# Patient Record
Sex: FEMALE | Race: WHITE | NOT HISPANIC OR LATINO | URBAN - METROPOLITAN AREA
[De-identification: names, ages, dates, MRNs, and addresses within clinical notes are randomized per-mention and may not be internally consistent; named-entity substitution may affect disease eponyms.]

---

## 2018-04-24 ENCOUNTER — OFFICE VISIT (OUTPATIENT)
Dept: FAMILY MEDICINE CLINIC | Facility: CLINIC | Age: 48
End: 2018-04-24

## 2018-04-24 VITALS
WEIGHT: 166.5 LBS | BODY MASS INDEX: 25.23 KG/M2 | SYSTOLIC BLOOD PRESSURE: 104 MMHG | HEIGHT: 68 IN | TEMPERATURE: 98.8 F | HEART RATE: 69 BPM | OXYGEN SATURATION: 97 % | DIASTOLIC BLOOD PRESSURE: 78 MMHG

## 2018-04-24 DIAGNOSIS — Z11.1 PPD SCREENING TEST: ICD-10-CM

## 2018-04-24 DIAGNOSIS — Z00.00 ROUTINE GENERAL MEDICAL EXAMINATION AT A HEALTH CARE FACILITY: Primary | ICD-10-CM

## 2018-04-24 PROCEDURE — 99396 PREV VISIT EST AGE 40-64: CPT | Performed by: FAMILY MEDICINE

## 2018-04-24 PROCEDURE — 86580 TB INTRADERMAL TEST: CPT

## 2018-04-24 RX ORDER — ALBUTEROL SULFATE 90 UG/1
1-2 AEROSOL, METERED RESPIRATORY (INHALATION)
COMMUNITY
Start: 2014-03-07

## 2018-04-24 RX ORDER — IRON/C/B12/FOLATE/ZINC/SUCCIN 75MG-175MG
TABLET ORAL
COMMUNITY
Start: 2018-02-26

## 2018-04-24 RX ORDER — MELOXICAM 15 MG/1
1 TABLET ORAL DAILY
COMMUNITY
Start: 2013-10-07 | End: 2018-04-24

## 2018-04-24 RX ORDER — SPIRONOLACTONE 100 MG/1
TABLET, FILM COATED ORAL
COMMUNITY
Start: 2018-03-14

## 2018-04-24 NOTE — PROGRESS NOTES
Assessment/Plan:    No problem-specific Assessment & Plan notes found for this encounter  Diagnoses and all orders for this visit:    Routine general medical examination at a health care facility       Lorretta Goldberg is a generally healthy 50-year-old female for her work physical for [employer]  I did complete her form, she received her PPD today, and she will return to the office in 2 days for her PPD reading and to  performs  Subjective:      Patient ID: Mohini Shepherd is a 49 y o  female  The pt is here today for a routine physical for [employer]  She has no specific complaints or concerns today  She does have a PCP whom she sees when needed          The following portions of the patient's history were reviewed and updated as appropriate: allergies, current medications, past family history, past medical history, past social history, past surgical history and problem list     Review of Systems   Constitutional: Negative for chills, fever and unexpected weight change  HENT: Negative for congestion, ear pain, hearing loss, postnasal drip, rhinorrhea and sore throat  Eyes: Negative for visual disturbance  Respiratory: Negative for cough and shortness of breath  Cardiovascular: Negative for chest pain  Gastrointestinal: Negative for abdominal pain, constipation and diarrhea  Genitourinary: Negative for difficulty urinating  Musculoskeletal: Negative for arthralgias and gait problem  Skin: Negative for rash  Does get allergies around this time of year that make her skin itch   Neurological: Negative for light-headedness and headaches  Psychiatric/Behavioral: Negative for dysphoric mood and sleep disturbance  The patient is not nervous/anxious            Objective:  Vitals:    04/24/18 1529   BP: 104/78   BP Location: Right arm   Pulse: 69   Temp: 98 8 °F (37 1 °C)   SpO2: 97%   Weight: 75 5 kg (166 lb 8 oz)   Height: 5' 7 5" (1 715 m)      Physical Exam   Constitutional: She is oriented to person, place, and time  She appears well-developed and well-nourished  HENT:   Head: Normocephalic and atraumatic  Mouth/Throat: Oropharynx is clear and moist    Eyes: Conjunctivae and EOM are normal    Neck: Normal range of motion  Neck supple  No thyroid mass present  Cardiovascular: Normal rate, regular rhythm and normal heart sounds  Pulmonary/Chest: Effort normal and breath sounds normal  No respiratory distress  She has no wheezes  She has no rales  Abdominal: Normal appearance  Musculoskeletal: Normal range of motion  She exhibits no edema  Neurological: She is alert and oriented to person, place, and time  Skin: Skin is warm, dry and intact  Psychiatric: She has a normal mood and affect  Her behavior is normal  Judgment and thought content normal    Nursing note and vitals reviewed

## 2023-03-06 ENCOUNTER — OFFICE VISIT (OUTPATIENT)
Dept: DERMATOLOGY | Facility: CLINIC | Age: 53
End: 2023-03-06

## 2023-03-06 VITALS — WEIGHT: 168 LBS | TEMPERATURE: 97.6 F | BODY MASS INDEX: 26.37 KG/M2 | HEIGHT: 67 IN

## 2023-03-06 DIAGNOSIS — L80 VITILIGO: Primary | ICD-10-CM

## 2023-03-06 DIAGNOSIS — L64.9 ANDROGENIC ALOPECIA: ICD-10-CM

## 2023-03-06 NOTE — PROGRESS NOTES
[facility] Note     Patient Name: Andi Abdullahi  Encounter Date: 3/6/2023    Have you been cared for by a [place] [id number] Dermatologist in the last 3 years and, if so, which description applies to you? NO  I am considered a "new" patient and must complete all patient intake questions  I am FEMALE/of child-bearing potential     REVIEW OF SYSTEMS:  Have you recently had or currently have any of the following? · Recent fever or chills? No  · Any non-healing wound? No  · Are you pregnant or planning to become pregnant? No  · Are you currently or planning to be nursing or breast feeding? No   PAST MEDICAL HISTORY:  Have you personally ever had or currently have any of the following? If "YES," then please provide more detail  · Skin cancer (such as Melanoma, Basal Cell Carcinoma, Squamous Cell Carcinoma? No  · Tuberculosis, HIV/AIDS, Hepatitis B or C: No  · Systemic Immunosuppression such as Diabetes, Biologic or Immunotherapy, Chemotherapy, Organ Transplantation, Bone Marrow Transplantation No  · Radiation Treatment No   FAMILY HISTORY:  Any "first degree relatives" (parent, brother, sister, or child) with the following? • Skin Cancer, Pancreatic or Other Cancer? No   PATIENT EXPERIENCE:    • Do you want the Dermatologist to perform a COMPLETE skin exam today including a clinical examination under the "bra and underwear" areas? NO  • If necessary, do we have your permission to call and leave a detailed message on your Preferred Phone number that includes your specific medical information?   Yes      Allergies   Allergen Reactions   • Amoxicillin Angioedema     Had hives with augmentin   • Amoxicillin-Pot Clavulanate Hives   • Bee Venom    • Cat Hair Extract    • Dog Epithelium    • Dust Mite Extract    • Pollen Extract       Current Outpatient Medications:   •  albuterol (PROAIR HFA) 90 mcg/act inhaler, Inhale 1-2 puffs, Disp: , Rfl:   •  Cholecalciferol 25 MCG (1000 UT) tablet, Take 1,000 Units by mouth daily, Disp: , Rfl:   •  GnToz-V03-NU-[id number]-DSS-SuccAc-Zn (FERIVA 21/7) 75-1 MG TABS, , Disp: , Rfl:   •  spironolactone (ALDACTONE) 100 mg tablet, , Disp: , Rfl:   •  ST JOHNS WORT PO, Take 500 mg by mouth every 24 hours, Disp: , Rfl:           • Whom besides the patient is providing clinical information about today's encounter?   o NO ADDITIONAL HISTORIAN (patient alone provided history)    Physical Exam and Assessment/Plan by Diagnosis:      VITILIGO    Physical Exam:  • Anatomic Location Affected:  Bilateral lower legs, finger tips and bilateral foot  • Morphological Description:  Depigmented patches  • Pertinent Positives:    • Pertinent Negatives: Additional History of Present Condition:    • Prior diagnosis of vitiligo? Yes  • Duration of condition? Since she was 8years old   • Associated symptoms? No  • Family history of autoimmune disease? Father      Assessment and Plan:  - Findings most consistent with vitiligo  - Educated that vitiligo is an acquired disorder of pigmentation that results in well-defined areas of chalky white depigmented skin  - Educated that associated symptoms are rare and that vitiligo characteristically lacks and scale, textural changes, or erythema   - Discussed that the precise pathogenesis of vitiligo is unclear but that it is thought to be related to an autoimmune attack on pigment producing cells, intrinsic defects in pigment producing cells, genetic predisposition and trauma  - Further discussed that while most patients with vitiligo are otherwise healthy, there is an association with autoimmune diseases and that thyroid studies are appropriate in patients who are newly diagnosed with vitiligo  - Educated on treatment goal to halt progression and ideally repigment areas  Discussed that hair depigmentation generally cannot repigment    - Discussed treatment options including, topical HOANG inhibitors (ruxolitinib), topical corticosteroids, topical calcineurin inhibitors, phototherapy, excimer laser  Based on a thorough discussion of this condition and the management approach to it (including a comprehensive discussion of the known risks, side effects and potential benefits of treatment), the patient (family) agrees to implement the following specific plan:  • Recommend sun avoidance and use of sunscreens to decrease contrast between involved and uninvolved skin  • Discussed topical ruxolitinib 1 5% cream BID  Patient will reach out to us if she wants to proceed with the cream  Educated that this treatment is the only FDA treatment approved for re-pigmentation in patients aged 15 years or older  Patient does not have current or prior non-skin malignancy  Educated on side effects, including infection in area of application, NMSC in areas of application, risk of hematologic toxicity (thrombocytopenia, anemia, neutropenia) or lipid abnormalities from possible systemic absorption  Discussed that no monitoring is requires apart from period skin exams to evaluate for NMSC and symptom driven investigations  ANDROGENETIC ALOPECIA      Hair loss has been going on for a decade  She mentioned that she was sick in 2019 when she lost a tremendous amount of hair  She complains of thinning and shedding  Denies feeling any kind of tingling  She stop the spirolactone about 4 years ago  She was on Feriva tabs, tried over the counter Niacin, supplements like Vitamin B and hair loss supplements  Recently she started taking collagen supplements  Father has hx of hair thinning  Physical Exam:    Well appearing, in no acute distress  Well nourishes, well developed  Alert and oriented  A focused skin exam was performed including scalp and hair   The exam was negative except as noted below:     • Scalp:   o Thinning over frontal and temporal scalp with mild part widening  o Negative hair pull test  o No perifollicular erythema or scale  o Follicular orifices in tact  o Eyebrows and Eyelashes In tact  • Pertinent Positives:  • Pertinent Negatives:      FEMALE PATIENT Assessment and Plan:    - 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  - Discussed therapeutic ladder, including minoxidil, low level laser therapy, spironolactone, finasteride, PRP, and hair transplantation      - Patient to start PO finasteride 5 mg  No personal or family history of breast cancer  Discussed generally well tolerated in post-menopausal women without side effects  Discussed that this can be used in women who are pre-menopausal if they are on OCP or similarly effective method of contraception           - Discussed additional options of light laser therapy; recommended hairmax brand laser band (can be purchased for about $800), which is thought to increase delivery of nutrients and restore the natural hair growth cycle, without notable side effects  Educated that studies were largely in patients who had active hair loss within the past 12 mos  Further discussed PRP therapy, educating that a recent study demonstrated that approximately 71% of patients with androgenetic alopecia respond to PRP treatments and that treatment consists of monthly PRP injections for 6 months followed by maintenance treatments every 3-6 months  - If patient would like to pursue supplements, recommended nutrafol or viviscal  - Educated patient that regrowth may take many months  - Will see patient back in 6 mos to assess progress  Follow up in July   Appointment in July scheduled virtual     Scribe Attestation    I,:  Darlene Wesley am acting as a scribe while in the presence of the attending physician :       I,:  Wendy Townsend MD personally performed the services described in this documentation    as scribed in my presence :

## 2023-03-06 NOTE — PATIENT INSTRUCTIONS
VITILIGO    Assessment and Plan:  - Findings most consistent with vitiligo  - Educated that vitiligo is an acquired disorder of pigmentation that results in well-defined areas of chalky white depigmented skin  - Educated that associated symptoms are rare and that vitiligo characteristically lacks and scale, textural changes, or erythema   - Discussed that the precise pathogenesis of vitiligo is unclear but that it is thought to be related to an autoimmune attack on pigment producing cells, intrinsic defects in pigment producing cells, genetic predisposition and trauma  - Further discussed that while most patients with vitiligo are otherwise healthy, there is an association with autoimmune diseases and that thyroid studies are appropriate in patients who are newly diagnosed with vitiligo  - Educated on treatment goal to halt progression and ideally repigment areas  Discussed that hair depigmentation generally cannot repigment  - Discussed treatment options including, topical HOANG inhibitors (ruxolitinib), topical corticosteroids, topical calcineurin inhibitors, phototherapy, excimer laser  Based on a thorough discussion of this condition and the management approach to it (including a comprehensive discussion of the known risks, side effects and potential benefits of treatment), the patient (family) agrees to implement the following specific plan:  LABS TODAY: CBC, TSH with reflex, Anti-TPO antibodies, vitamin D  Recommend sun avoidance and use of sunscreens to decrease contrast between involved and uninvolved skin  Discussed topical ruxolitinib 1 5% cream BID  Patient will reach out to us if she wants to proceed with the cream  Educated that this treatment is the only FDA treatment approved for re-pigmentation in patients aged 15 years or older  Patient does not have current or prior non-skin malignancy   Educated on side effects, including infection in area of application, NMSC in areas of application, risk of hematologic toxicity (thrombocytopenia, anemia, neutropenia) or lipid abnormalities from possible systemic absorption  Discussed that no monitoring is requires apart from period skin exams to evaluate for NMSC and symptom driven investigations  ANDROGENETIC ALOPECIA          FEMALE PATIENT Assessment and Plan:    - 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  - Discussed therapeutic ladder, including minoxidil, low level laser therapy, spironolactone, finasteride, PRP, and hair transplantation      - Patient to start PO finasteride 5 mg  No personal or family history of breast cancer  Discussed generally well tolerated in post-menopausal women without side effects  Discussed that this can be used in women who are pre-menopausal if they are on OCP or similarly effective method of contraception  Discussed additional options of light laser therapy; recommended hairmax brand laser band (can be purchased for about $800), which is thought to increase delivery of nutrients and restore the natural hair growth cycle, without notable side effects  Educated that studies were largely in patients who had active hair loss within the past 12 mos  Further discussed PRP therapy, educating that a recent study demonstrated that approximately 71% of patients with androgenetic alopecia respond to PRP treatments and that treatment consists of monthly PRP injections for 6 months followed by maintenance treatments every 3-6 months  - If patient would like to pursue supplements, recommended nutrafol or viviscal  - Educated patient that regrowth may take many months    - Will see patient back in 6 mos to assess progress  Follow up in July

## 2023-03-07 RX ORDER — FINASTERIDE 5 MG/1
TABLET, FILM COATED ORAL
Qty: 30 TABLET | Refills: 5 | Status: SHIPPED | OUTPATIENT
Start: 2023-03-07

## 2023-06-05 ENCOUNTER — EVALUATION (OUTPATIENT)
Dept: PHYSICAL THERAPY | Facility: REHABILITATION | Age: 53
End: 2023-06-05
Payer: COMMERCIAL

## 2023-06-05 DIAGNOSIS — Z98.890 HISTORY OF BUNIONECTOMY OF RIGHT GREAT TOE: Primary | ICD-10-CM

## 2023-06-05 PROCEDURE — 97162 PT EVAL MOD COMPLEX 30 MIN: CPT | Performed by: PHYSICAL THERAPIST

## 2023-06-05 PROCEDURE — 97140 MANUAL THERAPY 1/> REGIONS: CPT | Performed by: PHYSICAL THERAPIST

## 2023-06-05 NOTE — LETTER
2023    Jenni Samantha, [address]    Patient: Stefanie Mark Filling   YOB: 1970   Date of Visit: 2023     Encounter Diagnosis     ICD-10-CM    1  History of bunionectomy of right great toe  [id number] [id number]           Dear Dr Blanca Metcalf:    Thank you for your recent referral of [address]  Please review the attached evaluation summary from Jessica's recent visit  Please verify that you agree with the plan of care by signing the attached order  If you have any questions or concerns, please do not hesitate to call  I sincerely appreciate the opportunity to share in the care of one of your patients and hope to have another opportunity to work with you in the near future  Sincerely,    Kd Larson, PT      Referring Provider:      I certify that I have read the below Plan of Care and certify the need for these services furnished under this plan of treatment while under my care  Jenni Singh DPM  [address]  Via Fax: [phone]          PT Evaluation     Today's date: 2023  Patient name: Wily Rodriguez  : 1970  MRN: [id number]  Referring provider: Collette Hake, DPM  Dx:   Encounter Diagnosis     ICD-10-CM    1  History of bunionectomy of right great toe  [id number] [id number]           Start Time: 1530  Stop Time: 1625  Total time in clinic (min): 55 minutes    Assessment  Assessment details: Wily Rodriguez is a 47 y o  female presenting to outpatient physical therapy on 23 with referral from MD after bunionectomy on 23 with immobilization for 8 weeks  Upon evaluation, Светлана Dee demonstrates impaired ankle ROM, joint mobility throughout her foot and ankle as well as edema and pain with resulting functional loss  The listed impairments and functional limitation are effecting Светлана Dee ability to function at prior level   They can continue to benefit from physical therapy services at this times in order to address the above discussed impairments and functional limitation in order to allow for a return to premorbid status  HEP provided with ankle DF mobility, great toe mobility, toe curls  Impairments: abnormal gait, abnormal muscle firing, abnormal muscle tone, abnormal or restricted ROM, abnormal movement, activity intolerance, impaired physical strength and pain with function  Understanding of Dx/Px/POC: good   Prognosis: good    Plan  Patient would benefit from: skilled PT  Planned modality interventions: thermotherapy: hydrocollator packs  Planned therapy interventions: joint mobilization, manual therapy, ADL training, balance, balance/weight bearing training, neuromuscular re-education, home exercise program, therapeutic exercise, therapeutic activities, strengthening, patient education, functional ROM exercises and gait training  Frequency: 2x week  Duration in weeks: 10  Treatment plan discussed with: patient        Subjective Evaluation    History of Present Illness  Mechanism of injury: Josue Ignacio is a 47 y o  female presenting to physical therapy on 23 with referral from MD after right great toe bunionectomy on 23  She also has been experiencing R sided buttock and LBP as well since wearing the boot  She has had this LBP/hip pain before  describes it as burning in nature at times  She was in a cast for 5 weeks and a boot for 3 but has been transitioning over the last 2 weeks  Patient is fearful that she will not be able to return to her PLOF      Pain  Current pain ratin  At worst pain rating: 3  Location: R foot/toe      Diagnostic Tests  X-ray: abnormal  Treatments  No previous or current treatments  Patient Goals  Patient goals for therapy: decreased pain and increased motion  Patient goal: return to gardening, walking  Short Term Goals:   1  Patient will be Independent with hep  2   Patient will improve pain with activity by 50%  3  Patient will improve ankle DF to 5 deg AROM  4  Patient will improve great toe ext to 40 deg      Long Term Goals:   1  Patient will improve FOTO to greater then goal  2  Patient will improve pain with activity to 2/10 or less  3  Patient will continue with HEP independence to allow for decreased future reoccurrence of pain and loss in function  4  Patient will have normalized gait mechanics  5  Patient will return to recreational activity without limitation  6  Patient will report GROC 75% or greater        Objective    Posture: pes cavus  Palpation: TTP over great toe    Dermatome: (pinprick- L/R):  Decreased around scar              GAIT: decreased R stance time, weight acceptance with decreased left step length  Squat assess: favors R side    Singe Leg Stance: fair +  Steps: NT          MMT         AROM          PROM    Hip       L       R        L           R      L     R   ER          G  Max         G  Med         Iliop                     Knee         Extension         Flexion                  Ankle         Dorsi Flexion 5 3+ 15 -5     Plantar Flexion 5 3+ WNL 50     Inversion 5 3+ WNL WNL     Eversion 5 3+ WNL WNL     Great toe ext     90 20       Neuro Dynamic Testing:  NV - patient with increased pain today as further hip testing was held     Segmental mobility:   TCJ:  Hypomobile post glide    STJ: hypomobile eversion     Mid Foot: hypomobile   Cuboid:  normal         Distal Tibio-fibular Joint: hypomobile post glide                    Precautions: standard        Manuals 6/5            TCJ dist G3-4            STJ eversion mob G3-4            Great to dist G3-4                         Neuro Re-Ed                                                                                                        Ther Ex             LS assessment             Great toe stretch Strap - 3x            Ankle DF mob Step - 10x            Toe curls             Ankle pumps             VG - DF ROM Ther Activity                                       Gait Training                                       Modalities

## 2023-06-05 NOTE — PROGRESS NOTES
PT Evaluation     Today's date: 2023  Patient name: Erin Abraham  : 1970  MRN: [id number]  Referring provider: Jessica Ram DPM  Dx:   Encounter Diagnosis     ICD-10-CM    1  History of bunionectomy of right great toe  Z98 890           Start Time: 1530  Stop Time: 1625  Total time in clinic (min): 55 minutes    Assessment  Assessment details: Erin Abraham is a 47 y o  female presenting to outpatient physical therapy on 23 with referral from MD after bunionectomy on 23 with immobilization for 8 weeks  Upon evaluation, Nestor Chavez demonstrates impaired ankle ROM, joint mobility throughout her foot and ankle as well as edema and pain with resulting functional loss  The listed impairments and functional limitation are effecting Unknown Chavez ability to function at prior level  They can continue to benefit from physical therapy services at this times in order to address the above discussed impairments and functional limitation in order to allow for a return to premorbid status  HEP provided with ankle DF mobility, great toe mobility, toe curls      Impairments: abnormal gait, abnormal muscle firing, abnormal muscle tone, abnormal or restricted ROM, abnormal movement, activity intolerance, impaired physical strength and pain with function  Understanding of Dx/Px/POC: good   Prognosis: good    Plan  Patient would benefit from: skilled PT  Planned modality interventions: thermotherapy: hydrocollator packs  Planned therapy interventions: joint mobilization, manual therapy, ADL training, balance, balance/weight bearing training, neuromuscular re-education, home exercise program, therapeutic exercise, therapeutic activities, strengthening, patient education, functional ROM exercises and gait training  Frequency: 2x week  Duration in weeks: 10  Treatment plan discussed with: patient        Subjective Evaluation    History of Present Illness  Mechanism of injury: Nestor Chavez is a 47 y o  female presenting to physical therapy on 23 with referral from MD after right great toe bunionectomy on 23  She also has been experiencing R sided buttock and LBP as well since wearing the boot  She has had this LBP/hip pain before  describes it as burning in nature at times  She was in a cast for 5 weeks and a boot for 3 but has been transitioning over the last 2 weeks  Patient is fearful that she will not be able to return to her PLOF      Pain  Current pain ratin  At worst pain rating: 3  Location: R foot/toe      Diagnostic Tests  X-ray: abnormal  Treatments  No previous or current treatments  Patient Goals  Patient goals for therapy: decreased pain and increased motion  Patient goal: return to gardening, walking  Short Term Goals:   1  Patient will be Independent with hep  2  Patient will improve pain with activity by 50%  3  Patient will improve ankle DF to 5 deg AROM  4  Patient will improve great toe ext to 40 deg      Long Term Goals:   1  Patient will improve FOTO to greater then goal  2  Patient will improve pain with activity to 2/10 or less  3  Patient will continue with HEP independence to allow for decreased future reoccurrence of pain and loss in function  4  Patient will have normalized gait mechanics  5  Patient will return to recreational activity without limitation  6  Patient will report GROC 75% or greater        Objective    Posture: pes cavus  Palpation: TTP over great toe    Dermatome: (pinprick- L/R):  Decreased around scar              GAIT: decreased R stance time, weight acceptance with decreased left step length  Squat assess: favors R side    Singe Leg Stance: fair +  Steps: NT          MMT         AROM          PROM    Hip       L       R        L           R      L     R   ER          G  Max         G  Med         Iliop                     Knee         Extension         Flexion                  Ankle         Dorsi Flexion 5 3+ 15 -5     Plantar Flexion 5 3+ WNL 50 Inversion 5 3+ WNL WNL     Eversion 5 3+ WNL WNL     Great toe ext     90 20       Neuro Dynamic Testing:  NV - patient with increased pain today as further hip testing was held     Segmental mobility:   TCJ:  Hypomobile post glide    STJ: hypomobile eversion     Mid Foot: hypomobile   Cuboid:  normal         Distal Tibio-fibular Joint: hypomobile post glide                     Precautions: standard        Manuals 6/5            TCJ dist G3-4            STJ eversion mob G3-4            Great to dist G3-4                         Neuro Re-Ed                                                                                                        Ther Ex             LS assessment             Great toe stretch Strap - 3x            Ankle DF mob Step - 10x            Toe curls             Ankle pumps             VG - DF ROM                                       Ther Activity                                       Gait Training                                       Modalities

## 2023-06-09 ENCOUNTER — OFFICE VISIT (OUTPATIENT)
Dept: PHYSICAL THERAPY | Facility: REHABILITATION | Age: 53
End: 2023-06-09
Payer: COMMERCIAL

## 2023-06-09 DIAGNOSIS — Z98.890 HISTORY OF BUNIONECTOMY OF RIGHT GREAT TOE: Primary | ICD-10-CM

## 2023-06-09 PROCEDURE — 97110 THERAPEUTIC EXERCISES: CPT | Performed by: PHYSICAL THERAPIST

## 2023-06-09 PROCEDURE — 97140 MANUAL THERAPY 1/> REGIONS: CPT | Performed by: PHYSICAL THERAPIST

## 2023-06-09 NOTE — PROGRESS NOTES
"Daily Note     Today's date: 2023  Patient name: Dinesh Bonilla  : 1970  MRN: [id number]  Referring provider: Nelson Yao DPM  Dx:   Encounter Diagnosis     ICD-10-CM    1  History of bunionectomy of right great toe  Z98 890           Start Time: 1030  Stop Time: 1115  Total time in clinic (min): 45 minutes    Subjective: Patient reports that her ankle has been feeling better, still getting swelling but her exercises are going well as she is building confidence with walking  CC is more hip pain (R) at this time  Objective: See treatment diary below  Great toe ext = 40 deg  CC ankle DF 30 deg , OC 3 deg  Improved gait mechanics with improved step length LLE    Assessment: Tolerated treatment well  Fearful into hip extension at this time  Her ankle mobility and great toe mobility are improving as noted above  Hypomobility remains in STJ, TCJ and 1st MCPJ  Tolerated exercises well today  Added standing gastroc stretch to HEP  Attempted light standing LS ext in standing for improving R hip ext but that resulting in increased pain and hesitation as further movement was held  Plan: Continue per plan of care        Precautions: standard        Manuals            TCJ dist G3-4 G3-4           STJ eversion mob G3-4 G3-4           Great to dist G3-4 G3-4           assessment  5'           Neuro Re-Ed                                                                                                        Ther Ex             LS assessment             Great toe stretch Strap - 3x            Ankle DF mob Step - 10x 15x - chair           Toe curls             Ankle pumps             VG - DF ROM             Standing gastroc stretch  10\"x5           education time  2'           Ther Activity                                       Gait Training                                       Modalities                                            "

## 2023-06-12 ENCOUNTER — APPOINTMENT (OUTPATIENT)
Dept: PHYSICAL THERAPY | Facility: REHABILITATION | Age: 53
End: 2023-06-12
Payer: COMMERCIAL

## 2023-06-12 NOTE — PROGRESS NOTES
"Daily Note     Today's date: 2023  Patient name: Luann Callahan  : 1970  MRN: [id number]  Referring provider: Miller Becerra DPM  Dx: No diagnosis found  Subjective:       Objective: See treatment diary below      Assessment: Tolerated treatment well  Plan: Continue per plan of care        Precautions: standard        Manuals            TCJ dist G3-4 G3-4           STJ eversion mob G3-4 G3-4           Great to dist G3-4 G3-4           assessment  5'           Neuro Re-Ed                                                                                                        Ther Ex             LS assessment             Great toe stretch Strap - 3x            Ankle DF mob Step - 10x 15x - chair           Toe curls             Ankle pumps             VG - DF ROM             Standing gastroc stretch  10\"x5           education time  2'           Ther Activity                                       Gait Training                                       Modalities                                            "

## 2023-06-14 ENCOUNTER — OFFICE VISIT (OUTPATIENT)
Dept: PHYSICAL THERAPY | Facility: REHABILITATION | Age: 53
End: 2023-06-14
Payer: COMMERCIAL

## 2023-06-14 ENCOUNTER — OFFICE VISIT (OUTPATIENT)
Dept: DERMATOLOGY | Facility: CLINIC | Age: 53
End: 2023-06-14
Payer: COMMERCIAL

## 2023-06-14 VITALS — TEMPERATURE: 98 F | BODY MASS INDEX: 26.85 KG/M2 | WEIGHT: 171.1 LBS | HEIGHT: 67 IN

## 2023-06-14 DIAGNOSIS — D18.01 CHERRY ANGIOMA: ICD-10-CM

## 2023-06-14 DIAGNOSIS — L81.4 LENTIGINES: ICD-10-CM

## 2023-06-14 DIAGNOSIS — Z12.83 SCREENING FOR SKIN CANCER: Primary | ICD-10-CM

## 2023-06-14 DIAGNOSIS — D23.9 DERMATOFIBROMA: ICD-10-CM

## 2023-06-14 DIAGNOSIS — D22.9 MULTIPLE NEVI: ICD-10-CM

## 2023-06-14 DIAGNOSIS — Z98.890 HISTORY OF BUNIONECTOMY OF RIGHT GREAT TOE: Primary | ICD-10-CM

## 2023-06-14 DIAGNOSIS — L80 VITILIGO: ICD-10-CM

## 2023-06-14 DIAGNOSIS — L82.1 SEBORRHEIC KERATOSIS: ICD-10-CM

## 2023-06-14 DIAGNOSIS — D36.9 ANGIOFIBROMA: ICD-10-CM

## 2023-06-14 PROCEDURE — 97140 MANUAL THERAPY 1/> REGIONS: CPT | Performed by: PHYSICAL THERAPIST

## 2023-06-14 PROCEDURE — 97112 NEUROMUSCULAR REEDUCATION: CPT | Performed by: PHYSICAL THERAPIST

## 2023-06-14 PROCEDURE — 99213 OFFICE O/P EST LOW 20 MIN: CPT | Performed by: STUDENT IN AN ORGANIZED HEALTH CARE EDUCATION/TRAINING PROGRAM

## 2023-06-14 PROCEDURE — 97110 THERAPEUTIC EXERCISES: CPT | Performed by: PHYSICAL THERAPIST

## 2023-06-14 NOTE — PROGRESS NOTES
"[facility] Note     Patient Name: Rhiannon Christensen  Encounter Date: 06/14/23     Have you been cared for by a [facility] Dermatologist in the last 3 years and, if so, which description applies to you? Yes  I have been here within the last 3 years, and my medical history has NOT changed since that time  I am FEMALE/of child-bearing potential     REVIEW OF SYSTEMS:  Have you recently had or currently have any of the following? · No changes in my recent health  PAST MEDICAL HISTORY:  Have you personally ever had or currently have any of the following? If \"YES,\" then please provide more detail  · No changes in my medical history  FAMILY HISTORY:  Any \"first degree relatives\" (parent, brother, sister, or child) with the following? • No changes in my family's known health  PATIENT EXPERIENCE:    • Do you want the Dermatologist to perform a COMPLETE skin exam today including a clinical examination under the \"bra and underwear\" areas? Yes  • If necessary, do we have your permission to call and leave a detailed message on your Preferred Phone number that includes your specific medical information?   Yes      Allergies   Allergen Reactions   • Amoxicillin Angioedema     Had hives with augmentin   • Amoxicillin-Pot Clavulanate Hives   • Bee Venom    • Cat Hair Extract    • Dog Epithelium    • Dust Mite Extract    • Pollen Extract       Current Outpatient Medications:   •  albuterol (PROAIR HFA) 90 mcg/act inhaler, Inhale 1-2 puffs, Disp: , Rfl:   •  Cholecalciferol 25 MCG (1000 UT) tablet, Take 1,000 Units by mouth daily, Disp: , Rfl:   •  [id number]-DSS-SuccAc-Zn (FERIVA 21/7) 75-1 MG TABS, , Disp: , Rfl:   •  finasteride (PROSCAR) 5 mg tablet, TAKE 1 TABLET BY MOUTH EVERY DAY, Disp: 90 tablet, Rfl: 1  •  spironolactone (ALDACTONE) 100 mg tablet, , Disp: , Rfl:   •  ST JOHNS WORT PO, Take 500 mg by mouth every 24 hours, Disp: , Rfl:           • Whom besides the patient is providing " clinical information about today's encounter?   o NO ADDITIONAL HISTORIAN (patient alone provided history)    Physical Exam and Assessment/Plan by Diagnosis:    Patient is here for full skin exam  Patient was prescribed finasteride 5 mg for alopecia but never started on it due to starting hormone replacement therapy  Patient is taking combo of Biestrogen 5 mg, Progesterone 100 mg and Testosterone 1 5mg) and is happy on this for now  Patient stopped blood thinner 2 weeks ago from foot surgery 2 months ago  Patient would like to know more about ways to improve her jawline  SEBORRHEIC KERATOSES  - Relevant exam: Scattered over the face, trunk are waxy brown to black plaques and papules with stuck on appearance  - Exam and clinical history consistent with seborrheic keratoses  - Counseled that these are benign growths that do not require treatment  - Counseled that removal of lesions is considered cosmetic and so would incur a fee should patient elect to move forward  - Patient to hold on treatments for now but will inform us should they desire additional treatments    MELANOCYTIC NEVI  -Relevant exam: Scattered over the trunk/extremities are homogenously pigmented brown macules and papules  ELM performed and without concerning findings  - Exam and clinical history consistent with melanocytic nevi  - Educated on the ABCDE's of melanoma; handout provided  - Counseled to return to clinic prior to scheduled appointment should any of these lesions change or should any new lesions of concern arise  - Counseled on use of sun protection daily   Reviewed latest FDA sunscreen guidelines, including use of broad spectrum (UVA and UVB blocking) sunscreen or sun protective clothing with SPF 30-50 every 2-3 hours and reapplied after exposure to water; use of photoprotective clothing, including a broad brim hat and UPF rated clothing if outdoors for several hours; avoid use of tanning beds as these pose significant risk for melanoma and skin cancer  LENTIGINES  OTHER SKIN CHANGES DUE TO CHRONIC EXPOSURE TO NONIONIZING RADIATION  - Relevant exam: Over sun exposed areas are brown macules  ELM performed and without concerning findings  - Exam and clinical history consistent with lentigines  - Educated that these are indicative of prior sun exposure  - Counseled to return to clinic prior to scheduled appointment should any of these lesions change or should any new lesions of concern arise   - Recommended use of sunscreen as above and below  - Counseled on use of sun protection daily  Reviewed latest FDA sunscreen guidelines, including use of broad spectrum (UVA and UVB blocking) sunscreen or sun protective clothing with SPF 30-50 every 2-3 hours and reapplied after exposure to water; use of photoprotective clothing, including a broad brim hat and UPF rated clothing if outdoors for several hours; avoid use of tanning beds as these pose significant risk for melanoma and skin cancer  CHERRY ANGIOMAS  - Relevant exam: Scattered over the trunk/extremities are red papules  - Exam and clinical history consistent with cherry angiomas  - Educated that these are benign  - Educated that removal is considered aesthetic and would incur a fee  - Patient does not wish to pursue removal at this time but will contact us should this change  VITILIGO     Physical Exam:  • Anatomic Location Affected:  Bilateral lower legs, finger tips and bilateral feet  • Morphological Description:  Depigmented patches  • Pertinent Positives:    • Pertinent Negatives:     Additional History of Present Condition:    • Prior diagnosis of vitiligo? Yes  • Duration of condition? Since she was 8years old   • Associated symptoms? No  • Family history of autoimmune disease?  Father        Assessment and Plan:  - Findings most consistent with vitiligo  - Educated that vitiligo is an acquired disorder of pigmentation that results in well-defined areas of chalky white depigmented skin  - Educated that associated symptoms are rare and that vitiligo characteristically lacks and scale, textural changes, or erythema   - Discussed that the precise pathogenesis of vitiligo is unclear but that it is thought to be related to an autoimmune attack on pigment producing cells, intrinsic defects in pigment producing cells, genetic predisposition and trauma  - Further discussed that while most patients with vitiligo are otherwise healthy, there is an association with autoimmune diseases and that thyroid studies are appropriate in patients who are newly diagnosed with vitiligo  - Educated on treatment goal to halt progression and ideally repigment areas  Discussed that hair depigmentation generally cannot repigment  - Discussed treatment options including, topical HOANG inhibitors (ruxolitinib), topical corticosteroids, topical calcineurin inhibitors, phototherapy, excimer laser  Based on a thorough discussion of this condition and the management approach to it (including a comprehensive discussion of the known risks, side effects and potential benefits of treatment), the patient (family) agrees to implement the following specific plan:  • Recommend sun avoidance and use of sunscreens to decrease contrast between involved and uninvolved skin  • Patient wants to discuss with  about treatment for vitiligo first before starting cream  Discussed topical ruxolitinib 1 5% cream BID  Patient will reach out to us if she wants to proceed with the cream  Educated that this treatment is the only FDA treatment approved for re-pigmentation in patients aged 15 years or older  Patient does not have current or prior non-skin malignancy  Educated on side effects, including infection in area of application, NMSC in areas of application, risk of hematologic toxicity (thrombocytopenia, anemia, neutropenia) or lipid abnormalities from possible systemic absorption   Discussed that no monitoring is requires "apart from period skin exams to evaluate for NMSC and symptom driven investigations  DERMATOFIBROMA    Physical Exam:  • Anatomic Location Affected:  Left leg  • Morphological Description:  Pink firm papule with surrounding hyperpigmentation  • Pertinent Positives:  • Pertinent Negatives: No itch, pain    Additional History of Present Condition: Present on exam    Assessment and Plan:  - History and physical consistent with dermatofibroma  - Educated that these lesions are generally benign but there are very rare subtypes that can spread to other parts of the body  - No evidence of eruptive dermatofibromas (not >15 lesions with acute onset)  - Educated the efforts to treat lesions with cryotherapy, shave biopsy and laser surgery are rarely completely successful  Based on a thorough discussion of this condition and the management approach to it (including a comprehensive discussion of the known risks, side effects and potential benefits of treatment), the patient (family) agrees to implement the following specific plan:  • Educated patient to call clinic if lesion changes (enlarges, ulcerates)     FIBROUS PAPULE (\"ANGIOFIBROMA\")    Physical Exam:  • Anatomic Location Affected:  Left lateral nose  • Morphological Description:  Skin colored to pink papule  • Pertinent Positives:  • Pertinent Negatives: Additional History of Present Condition:  Present on Exam    Assessment and Plan:  Based on a thorough discussion of this condition and the management approach to it (including a comprehensive discussion of the known risks, side effects and potential benefits of treatment), the patient (family) agrees to implement the following specific plan:  • Referred to Dr Jocelyn Mahmood for cosmetic visit  Patient to also discuss treatments for face lentigines and kybella for jawline enhancement at that time  A fibrous papule is a firm bump that most often occurs on the nose   It is very common and has a characteristic appearance " under the microscope  A fibrous papule develops during late adolescence or early adult life on the nose, or less often, elsewhere on the face  It is a dome shaped shiny bump measuring 2-6 mm in diameter, sometimes with a central hair  Although it looks similar to a skin-colored mole (dermal nevus), it is firmer in texture  It is harmless but will not go away on its own  It is important to distinguish fibrous papule from basal cell carcinoma, which may also present as a firm shiny bump  Basal cell carcinoma most often arises later in life  It grows slowly and tends to bleed and ulcerate  A fibrous papule is diagnosed either clinically or by skin biopsy  There are distinctive features  on pathology (proliferation of fibroblasts, fibrotic stroma, and dilated blood vessels)  Fibrous papule is considered a nevus, and develops spontaneously  The precise reason is unknown  Fibrous papules do not require any treatment  If desired it may be removed by a minor surgical procedure (excision biopsy, shave biopsy or electrosurgery)      Scribe Attestation    I,:  Jorge Schneider am acting as a scribe while in the presence of the attending physician :       I,:  Juan Ramon Dupree MD personally performed the services described in this documentation    as scribed in my presence :

## 2023-06-14 NOTE — PROGRESS NOTES
"Daily Note     Today's date: 2023  Patient name: Mark Briseno  : 1970  MRN: [id number]  Referring provider: Arlene Carey DPM  Dx:   Encounter Diagnosis     ICD-10-CM    1  History of bunionectomy of right great toe  Z98 890           Start Time: 1230  Stop Time: 1315  Total time in clinic (min): 45 minutes    Subjective: Patient reports that she does feel some soreness along the outside of her foot, toe feeling fine      Objective: See treatment diary below  Great toe ROM = 40 deg    Assessment: Tolerated treatment well  Improving joint mobility throughout her foot including STJ, TCJ and great toe  Updated HEP with HR  Hip seems to also be improving as she has improve hip extension into terminal stance  Plan: Continue per plan of care        Precautions: standard        Manuals           TCJ dist G3-4 G3-4 G3-4          STJ eversion mob G3-4 G3-4 G3-4          Great to dist G3-4 G3-4 G3-4          assessment  5' 5'          Neuro Re-Ed             SLS   5\"x10 R          Balance board   20x AP and 10x ML                                                                           Ther Ex            LS assessment             Great toe stretch Strap - 3x            HR   2x10                       Ankle DF mob Step - 10x 15x - chair 2 way - 15x ea          Toe curls             Ankle pumps             VG - DF ROM             Standing gastroc stretch  10\"x5 NP          education time  2' 3'          Ther Activity                                       Gait Training                                       Modalities                                            "

## 2023-06-14 NOTE — PATIENT INSTRUCTIONS
What is skin cancer? Skin cancer is unfortunately very common  That's why we are here to help you on your journey to healthy happy skin! There are two main types of skin cancer: melanoma and non-melanoma skin cancer  Melanoma is a form of skin cancer that often arises within an existing nevus or mole  However, this is not always the case  Melanoma can arise anywhere (not only where you have moles right now)  Melanoma can run in families, so letting us know about your family history is important  Non-melanoma skin cancer is the most common type of cancer in the [place]  The two main types of non-melanoma skin cancers are basal cell carcinomas (BCC) and squamous cell carcinoma (SCC)  These cancers tend to be less aggressive than melanomas but are still important to look for and treat  What can I do to prevent skin cancer? One of the largest risk factors for skin cancer is sun exposure or UV radiation  Therefore, sun protection is cordova! Here are some great tips for protecting yourself! Try to avoid direct sun exposure during peak sun hours (10 AM to 2 PM)  Remember you get A LOT of sun even under cloud coverage and through care windows! When choosing a sunscreen, look for one that says “broad spectrum” sunscreen  This means it protects you from more of the harmful UV rays  Choose a sunscreen that is SPF 30 or greater for best protection  Apply sunscreen to all sun-exposed skin and reapply every 2 hours  Consider sun protective clothing! Great additions to your sun protective clothing wardrobe include broad brimmed hats, sunglasses, UPF clothing  Avoid tanning salons  These have been shown to be very harmful in terms of your risk of skin cancer  Avoid “base tans”  We now know that tans are dangerous (not just sun burns)  If you want to have a tan for a trip, consider a spray tan! Should I check my skin at home between my dermatology appointments? Yes!  It's always a great idea to look at your skin on a regular basis  Here are some things to look for when monitoring your skin  For melanoma, look for the ABCDE's! A = Asymmetry  Look for a spot where one half does not match the other! B = Borders  Look for a spot that has jagged, ragged or irregular borders  C = Color  Look for a spot that is not evenly colored and often includes multiple colors, especially true black, red, white, blue, grey  D = Diameter  Look for a spot that is larger than the size of a pencil eraser  E = Evolution  If you ever have a spot that is changing in shape, color, size or symptoms (becomes itchy, painful or starts to bleed), always call us! For non-melanoma skin cancers, look for a new, pink spot that is not going away, especially one that is itchy, painful or bleeding  What should I do if I see a spot that is concerning for melanoma or non-melanoma skin cancer? If you are ever concerned, call us! Do not wait for your next appointment  We want to help!

## 2023-06-19 ENCOUNTER — OFFICE VISIT (OUTPATIENT)
Dept: PHYSICAL THERAPY | Facility: REHABILITATION | Age: 53
End: 2023-06-19
Payer: COMMERCIAL

## 2023-06-19 DIAGNOSIS — Z98.890 HISTORY OF BUNIONECTOMY OF RIGHT GREAT TOE: Primary | ICD-10-CM

## 2023-06-19 PROCEDURE — 97110 THERAPEUTIC EXERCISES: CPT | Performed by: PHYSICAL THERAPIST

## 2023-06-19 PROCEDURE — 97112 NEUROMUSCULAR REEDUCATION: CPT | Performed by: PHYSICAL THERAPIST

## 2023-06-19 PROCEDURE — 97140 MANUAL THERAPY 1/> REGIONS: CPT | Performed by: PHYSICAL THERAPIST

## 2023-06-19 NOTE — PROGRESS NOTES
"Daily Note     Today's date: 2023  Patient name: Nicole Chau  : 1970  MRN: [id number]  Referring provider: Geronimo Jefferson DPM  Dx:   Encounter Diagnosis     ICD-10-CM    1  History of bunionectomy of right great toe  Z98 890           Start Time: 1050  Stop Time: 1140  Total time in clinic (min): 50 minutes    Subjective: Patient reports being more sore today in hip and foot as she has been doing a lot of work on her new home, on feet a lot  Objective: See treatment diary below  OC DF = 8 deg, CC = 30 deg, Great toe ext = 45 deg    Assessment: Tolerated treatment well  Initially with decreased hip ext RLE and decreased push of at terminal stance that improved after re-ed  ROM progressing well  Challenged with SLS on RLE, initially only able to hold for 3\" that improve to 10\" with increasing repetitions  Plan: Continue per plan of care        Precautions: standard        Manuals          TCJ dist G3-4 G3-4 G3-4 G3-4         STM posterior heel/achilles     AB         STJ eversion mob G3-4 G3-4 G3-4 G3-4         Great to dist G3-4 G3-4 G3-4 G3-4         assessment  5' 5' 5'         Neuro Re-Ed             SLS   5\"x10 R 10\"x5          Balance board   20x AP and 10x ML 2x20 AP and ML         Seated/standing toe curls    - 3\"x10 each                                                             Ther Ex           LS assessment             Great toe stretch Strap - 3x            HR   2x10 NV                      Ankle DF mob Step - 10x 15x - chair 2 way - 15x ea 2 way 10x ea         Hip ext sliders    10x          Ankle pumps             VG - DF ROM             Standing gastroc stretch  10\"x5 NP See above         education time  2' 3' 3'         Ther Activity                                       Gait Training                                       Modalities                                            "

## 2023-06-21 ENCOUNTER — OFFICE VISIT (OUTPATIENT)
Dept: PHYSICAL THERAPY | Facility: REHABILITATION | Age: 53
End: 2023-06-21
Payer: COMMERCIAL

## 2023-06-21 DIAGNOSIS — Z98.890 HISTORY OF BUNIONECTOMY OF RIGHT GREAT TOE: Primary | ICD-10-CM

## 2023-06-21 PROCEDURE — 97112 NEUROMUSCULAR REEDUCATION: CPT | Performed by: PHYSICAL THERAPIST

## 2023-06-21 PROCEDURE — 97110 THERAPEUTIC EXERCISES: CPT | Performed by: PHYSICAL THERAPIST

## 2023-06-21 PROCEDURE — 97140 MANUAL THERAPY 1/> REGIONS: CPT | Performed by: PHYSICAL THERAPIST

## 2023-06-21 NOTE — PROGRESS NOTES
"Daily Note     Today's date: 2023  Patient name: Darrel Mcguire  : 1970  MRN: [id number]  Referring provider: Surinder Denton DPM  Dx:   Encounter Diagnosis     ICD-10-CM    1  History of bunionectomy of right great toe  Z98 890           Start Time: 822  Stop Time: 910  Total time in clinic (min): 48 minutes    Subjective: Patient reports that she has been on her feet a lot the last few days, sore  Mostly feeling pain lateral foot  Objective: See treatment diary below  Hypomobile STJ eversion   (-) ANTT peroneal nerve    Assessment: Tolerated treatment well  Patient reports improved lateral foot pain after taping to STJ but did notice more stain through great toe  discussed taking off tape after 2 days or if skin reaction occurs  STJ hypomobility likely driving her mid foot strain  Will continue with foot/ankle mobility as tolerated  Discussed continued HEP along with gait drills working on getting weight though mid foot during stance phase to push off  Plan: Continue per plan of care        Precautions: standard        Manuals         TCJ dist G3-4 G3-4 G3-4 G3-4 G3-4        STM posterior heel/achilles     AB         Calc taping - eversion     AB        STJ eversion mob G3-4 G3-4 G3-4 G3-4 G3-4        Great to dist G3-4 G3-4 G3-4 G3-4 G3-4        assessment  5' 5' 5' 5'        Neuro Re-Ed             SLS   5\"x10 R 10\"x5  NV        Balance board   20x AP and 10x ML 2x20 AP and ML 2x20 AP and ML        Seated/standing toe curls    - 3\"x10 each NV                                                            Ther Ex          LS assessment             Great toe stretch Strap - 3x            HR   2x10 NV NV        Gait drills - rockig     3'        Ankle DF mob Step - 10x 15x - chair 2 way - 15x ea 2 way 10x ea 2 way - 10x ea        Hip ext sliders    10x          Ankle pumps             VG - DF ROM             Standing gastroc stretch  10\"x5 NP See " above See above        education time  2' 3' 3' 5'        Ther Activity                                       Gait Training                                       Modalities

## 2023-06-28 ENCOUNTER — OFFICE VISIT (OUTPATIENT)
Dept: PHYSICAL THERAPY | Facility: REHABILITATION | Age: 53
End: 2023-06-28
Payer: COMMERCIAL

## 2023-06-28 DIAGNOSIS — Z98.890 HISTORY OF BUNIONECTOMY OF RIGHT GREAT TOE: Primary | ICD-10-CM

## 2023-06-28 PROCEDURE — 97110 THERAPEUTIC EXERCISES: CPT

## 2023-06-28 PROCEDURE — 97140 MANUAL THERAPY 1/> REGIONS: CPT

## 2023-06-28 PROCEDURE — 97112 NEUROMUSCULAR REEDUCATION: CPT

## 2023-06-28 NOTE — PROGRESS NOTES
"Daily Note     Today's date: 2023  Patient name: Bernardo Rojo  : 1970  MRN: [id number]  Referring provider: Mady Townsend DPM  Dx:   Encounter Diagnosis     ICD-10-CM    1  History of bunionectomy of right great toe  Z98 890           Start Time: 930  Stop Time: 1015  Total time in clinic (min): 45 minutes    Subjective: Patient reports that she felt the taping was beneficial last session  She reports continued stiffness soreness into Right great toe and soreness along Right lateral foot  Objective: See treatment diary below      Assessment: Tolerated treatment well  Patient exhibited good technique with therapeutic exercises and would benefit from continued PT  Added standing GS and soleus stretches and progressed SLS to compliant surface  Patient was challenged by program and significant tightness noted into soleus  All mobs performed by Cindy Murguia DPT  Plan: Continue per plan of care  Progress treatment as tolerated  Consider adding TB overpressure to self DF mob NV        Precautions: standard        Manuals        TCJ dist G3-4 G3-4 G3-4 G3-4 G3-4 QD       STM posterior heel/achilles     AB         Calc taping - eversion     AB QD       STJ eversion mob G3-4 G3-4 G3-4 G3-4 G3-4 QD       Great to dist G3-4 G3-4 G3-4 G3-4 G3-4 QD       assessment  5' 5' 5' 5'        Neuro Re-Ed             SLS   5\"x10 R 10\"x5  NV Foam: 10\"x5       Balance board   20x AP and 10x ML 2x20 AP and ML 2x20 AP and ML 2x20 AP/ML       Seated/standing toe curls    - 3\"x10 each NV NV                                                           Ther Ex         LS assessment             Great toe stretch Strap - 3x            HR   2x10 NV NV 2x10       Gait drills - rockig     3' Rock fwd/back 30x ea       Ankle DF mob Step - 10x 15x - chair 2 way - 15x ea 2 way 10x ea 2 way - 10x ea Chair: 2 way 10\"x10       Hip ext sliders    10x          Ankle pumps   " "          VG - DF ROM             Standing gastroc stretch  10\"x5 NP See above See above GS/Soleus 30\"x3       education time  2' 3' 3' 5'        Ther Activity                                       Gait Training                                       Modalities                                            "

## 2023-06-30 ENCOUNTER — OFFICE VISIT (OUTPATIENT)
Dept: PHYSICAL THERAPY | Facility: REHABILITATION | Age: 53
End: 2023-06-30
Payer: COMMERCIAL

## 2023-06-30 DIAGNOSIS — Z98.890 HISTORY OF BUNIONECTOMY OF RIGHT GREAT TOE: Primary | ICD-10-CM

## 2023-06-30 PROCEDURE — 97112 NEUROMUSCULAR REEDUCATION: CPT

## 2023-06-30 PROCEDURE — 97140 MANUAL THERAPY 1/> REGIONS: CPT

## 2023-06-30 PROCEDURE — 97110 THERAPEUTIC EXERCISES: CPT

## 2023-06-30 NOTE — PROGRESS NOTES
"Daily Note     Today's date: 2023  Patient name: Jose Alfredo Smallwood  : 1970  MRN: [id number]  Referring provider: Sergey Brown DPM  Dx:   Encounter Diagnosis     ICD-10-CM    1  History of bunionectomy of right great toe  Z98 890           Start Time: 1505  Stop Time: 1550  Total time in clinic (min): 45 minutes    Subjective: Pt reports R foot slightly more agitated today along 3rd and 4th ray, thinking it might have been from some of the manuals performed LV and being very stiff in this region  Feels like the taping performed LV was not quite as supportive as the time before  Objective: See treatment diary below      Assessment: Tolerated treatment well  Continued with program as outlined below  Tends to role ankle into supination during HR today  Trialled small towel roll under lateral aspect of R foot to avoid inversion and supination during this intervention  SLS held today to avoid further aggravation of symptoms  Requires a bit of focus for proper gait rocking for proper heel/toe mechanics  Patient would benefit from continued PT to improve R ankle mobility, decrease pain, and maximize overall function  Plan: Continue per plan of care        Precautions: standard        Manuals       TCJ dist G3-4 G3-4 G3-4 G3-4 G3-4 QD G3-4  PRR      STM posterior heel/achilles     AB         Calc taping - eversion     AB QD PRR      STJ eversion mob G3-4 G3-4 G3-4 G3-4 G3-4 QD G3-4  PRR      Great to dist G3-4 G3-4 G3-4 G3-4 G3-4 QD G3-4  PRR      assessment  5' 5' 5' 5'        Neuro Re-Ed             SLS   5\"x10 R 10\"x5  NV Foam: 10\"x5 NV      Balance board   20x AP and 10x ML 2x20 AP and ML 2x20 AP and ML 2x20 AP/ML 2x20 AP/ML      Seated/standing toe curls    - 3\"x10 each NV NV NV                                                          Ther Ex        LS assessment             Great toe stretch Strap - 3x            HR   " "2x10 NV NV 2x10 2x10      Gait drills - [facility]     3' Rock fwd/back 30x ea Rock fwd/back 30x ea      Ankle DF mob Step - 10x 15x - chair 2 way - 15x ea 2 way 10x ea 2 way - 10x ea Chair: 2 way 10\"x10 Chair  10\"x10      Hip ext sliders    10x          Ankle pumps             VG - DF ROM             Standing gastroc stretch  10\"x5 NP See above See above GS/Soleus 30\"x3 GS/Soleus 30\"x3      education time  2' 3' 3' 5'        Ther Activity                                       Gait Training                                       Modalities                                            "

## 2023-07-05 ENCOUNTER — OFFICE VISIT (OUTPATIENT)
Dept: PHYSICAL THERAPY | Facility: REHABILITATION | Age: 53
End: 2023-07-05
Payer: COMMERCIAL

## 2023-07-05 DIAGNOSIS — Z98.890 HISTORY OF BUNIONECTOMY OF RIGHT GREAT TOE: Primary | ICD-10-CM

## 2023-07-05 PROCEDURE — 97140 MANUAL THERAPY 1/> REGIONS: CPT

## 2023-07-05 PROCEDURE — 97112 NEUROMUSCULAR REEDUCATION: CPT

## 2023-07-05 PROCEDURE — 97110 THERAPEUTIC EXERCISES: CPT

## 2023-07-05 NOTE — PROGRESS NOTES
Daily Note     Today's date: 2023  Patient name: Flores Enriquez  : 1970  MRN: [id number]  Referring provider: Annita Patel DPM  Dx:   Encounter Diagnosis     ICD-10-CM    1. History of bunionectomy of right great toe  Z98.890           Start Time: 830  Stop Time: 0915  Total time in clinic (min): 45 minutes    Subjective: Patient reports that she continues with some pain, but has swelling in the right ankle today. Objective: See treatment diary below      Assessment: Tolerated treatment well. Patient participated in skilled PT session focused on strengthening, stretching, and ROM. Patient able to complete exercise program with no increase in sx. Patient continues to demonstrate R foot supination with HR, corrected with towel placement laterally. Patient with R DF tightness this session. Patient would continue to benefit from skilled PT interventions to address strengthening, stretching, and ROM. Patient demonstrated fatigue post treatment and exhibited good technique with therapeutic exercises       Plan: Continue per plan of care.       Precautions: standard        Manuals  7/5     TCJ dist G3-4 G3-4 G3-4 G3-4 G3-4 QD G3-4  PRR      STM posterior heel/achilles     AB         Calc taping - eversion     AB QD PRR      STJ eversion mob G3-4 G3-4 G3-4 G3-4 G3-4 QD G3-4  PRR      Great to dist G3-4 G3-4 G3-4 G3-4 G3-4 QD G3-4  PRR      assessment  5' 5' 5' 5'        Neuro Re-Ed             SLS   5"x10 R 10"x5  NV Foam: 10"x5 NV Foam 10" 5x     Balance board   20x AP and 10x ML 2x20 AP and ML 2x20 AP and ML 2x20 AP/ML 2x20 AP/ML 2x0 ea AP/ML     Seated/standing toe curls    - 3"x10 each NV NV NV 3" 2x10                                                         Ther Ex   7/     LS assessment             Great toe stretch Strap - 3x            HR   2x10 NV NV 2x10 2x10 2x10     Gait drills - [facility]     3' Rock fwd/back 30x [facility] fwd/back 30x ea Rock Fwd/bwd 30x ea     Ankle DF mob Step - 10x 15x - chair 2 way - 15x ea 2 way 10x ea 2 way - 10x ea Chair: 2 way 10"x10 Chair  10"x10 Chair 10: 10x     Hip ext sliders    10x          Ankle pumps             VG - DF ROM             Standing gastroc stretch  10"x5 NP See above See above GS/Soleus 30"x3 GS/Soleus 30"x3 GS/Soleus 30" 3x     education time  2' 3' 3' 5'        Ther Activity                                       Gait Training                                       Modalities

## 2023-07-10 ENCOUNTER — OFFICE VISIT (OUTPATIENT)
Dept: PHYSICAL THERAPY | Facility: REHABILITATION | Age: 53
End: 2023-07-10
Payer: COMMERCIAL

## 2023-07-10 DIAGNOSIS — Z98.890 HISTORY OF BUNIONECTOMY OF RIGHT GREAT TOE: Primary | ICD-10-CM

## 2023-07-10 PROCEDURE — 97140 MANUAL THERAPY 1/> REGIONS: CPT

## 2023-07-10 PROCEDURE — 97112 NEUROMUSCULAR REEDUCATION: CPT

## 2023-07-10 PROCEDURE — 97110 THERAPEUTIC EXERCISES: CPT

## 2023-07-10 NOTE — PROGRESS NOTES
Daily Note     Today's date: 7/10/2023  Patient name: Nestor Cisneros  : 1970  MRN: [id number]  Referring provider: Navjot Simons DPM  Dx:   Encounter Diagnosis     ICD-10-CM    1. History of bunionectomy of right great toe  Z98.890           Start Time: 948  Stop Time: 1033  Total time in clinic (min): 45 minutes    Subjective: Arya Alegre says her foot has been a little more sore lately because of the work she is doing on her house. She has been concentrating a lot on walking normally but isn't sure she is quite there yet. She is wearing an ace bandage to help with the swelling, and feels like it has been helping. She had her hip worked on by a chiropractor and says it feels like it is a little better. Objective: See treatment diary below  Re-wrapped patient's ankle in ace bandage and provided patient education regarding proper application. Assessment: Arya Alegre responded appropriately to interventions performed today. Arya Alegre continues to experience great toe and talocrural hypomobility, leading to early toe off in stance phase and slow, cautious gait. Patient's distal hypomobility is likely causing symptoms up the kinetic chain. Performed session without soes on to increase motion of great toe and ankle and improve confidence in un shoed activity with good response from patient. Continued to focus on improving range of motion, strength and balance. Arya Alegre would benefit from continued skilled physical therapy. Plan: Continue per plan of care.       Precautions: standard        Manuals 6/5 6/9 6/14 6/19 6/21 6/28 6/30 7/5 7/10    TCJ dist G3-4 G3-4 G3-4 G3-4 G3-4 QD G3-4  PRR  LM gr 3-4    STM posterior heel/achilles     AB         Calc taping - eversion     AB QD PRR      STJ eversion mob G3-4 G3-4 G3-4 G3-4 G3-4 QD G3-4  PRR  LM gr III-IV    Great to dist G3-4 G3-4 G3-4 G3-4 G3-4 QD G3-4  PRR  LM Gr III-IV    assessment  5' 5' 5' 5'        Neuro Re-Ed             SLS   5"x10 R 10"x5  NV Foam: 10"x5 NV Foam 10" 5x Foam sls 6x10" no shoe     Balance board   20x AP and 10x ML 2x20 AP and ML 2x20 AP and ML 2x20 AP/ML 2x20 AP/ML 2x0 ea AP/ML 2x20 ea ap/ml    Seated/standing toe curls    - 3"x10 each NV NV NV 3" 2x10 standing 2x10 3"                                                        Ther Ex  6/9 6/14 6/19 6/21 6/28 6/30 7/5 7/10    LS assessment             Great toe stretch Strap - 3x        strap 6x10"    HR   2x10 NV NV 2x10 2x10 2x10 2x10    Gait drills - rockig     3' Rock fwd/back 30x ea Rock fwd/back 30x ea Rock Fwd/bwd 30x ea Fwd/bkw rock 30x ea    Ankle DF mob Step - 10x 15x - chair 2 way - 15x ea 2 way 10x ea 2 way - 10x ea Chair: 2 way 10"x10 Chair  10"x10 Chair 10: 10x Chair 10x10x    Hip ext sliders    10x          Ankle pumps             VG - DF ROM             Standing gastroc stretch  10"x5 NP See above See above GS/Soleus 30"x3 GS/Soleus 30"x3 GS/Soleus 30" 3x NP    education time  2' 3' 3' 5'        Ther Activity                                       Gait Training                                       Modalities

## 2023-07-12 ENCOUNTER — OFFICE VISIT (OUTPATIENT)
Dept: PHYSICAL THERAPY | Facility: REHABILITATION | Age: 53
End: 2023-07-12
Payer: COMMERCIAL

## 2023-07-12 DIAGNOSIS — Z98.890 HISTORY OF BUNIONECTOMY OF RIGHT GREAT TOE: Primary | ICD-10-CM

## 2023-07-12 PROCEDURE — 97110 THERAPEUTIC EXERCISES: CPT | Performed by: PHYSICAL THERAPIST

## 2023-07-12 PROCEDURE — 97140 MANUAL THERAPY 1/> REGIONS: CPT | Performed by: PHYSICAL THERAPIST

## 2023-07-12 NOTE — PROGRESS NOTES
Daily Note     Today's date: 2023  Patient name: Elana Krishnan  : 1970  MRN: [id number]  Referring provider: Sami Su DPM  Dx:   Encounter Diagnosis     ICD-10-CM    1. History of bunionectomy of right great toe  Z98.890           Start Time: 945  Stop Time: 1030  Total time in clinic (min): 45 minutes    Subjective: Patient reports that she has been making progress. Will get sore if on feet too long. Medication does help with pain. Objective: See treatment diary below  Hypomobile 1st ray, great toe TMTJ  Improving STJ mobility  (-) ANTT sural or superficial peroneal nerve  FOTO: improved 32 to 52    Assessment: Tolerated treatment well. Improving gait mechanics as her STJ mobility is improving resulting in improve mid foot pronation. Discussed working on CC toe DF and HR at home to continue to improve mobility. Plan: Continue per plan of care.       Precautions: standard        Manuals 6/5 6/9 6/14 6/19 6/21 6/28 6/30 7/5 7/10 7/12   TCJ dist G3-4 G3-4 G3-4 G3-4 G3-4 QD G3-4  PRR  LM gr 3-4 G5   STM posterior heel/achilles     AB         Calc taping - eversion     AB QD PRR      STJ eversion mob G3-4 G3-4 G3-4 G3-4 G3-4 QD G3-4  PRR  LM gr III-IV NP   !st ray planar glide          G4   Great to dist G3-4 G3-4 G3-4 G3-4 G3-4 QD G3-4  PRR  LM Gr III-IV AB G3-4   assessment  5' 5' 5' 5'     5'   Neuro Re-Ed             SLS   5"x10 R 10"x5  NV Foam: 10"x5 NV Foam 10" 5x Foam sls 6x10" no shoe     Balance board   20x AP and 10x ML 2x20 AP and ML 2x20 AP and ML 2x20 AP/ML 2x20 AP/ML 2x0 ea AP/ML 2x20 ea ap/ml 20x AP shweta stance R radha   Seated/standing toe curls    - 3"x10 each NV NV NV 3" 2x10 standing 2x10 3"                                                        Ther Ex   7/5 7/10 7/12   Bike          5'   Great toe stretch Strap - 3x        strap 6x10"    HR   2x10 NV NV 2x10 2x10 2x10 2x10 2x10   Gait drills - rock     3' Rock fwd/back 30x ea Rock fwd/back 30x ea Rock Fwd/bwd 30x ea Fwd/bkw rock 30x ea    Ankle DF mob Step - 10x 15x - chair 2 way - 15x ea 2 way 10x ea 2 way - 10x ea Chair: 2 way 10"x10 Chair  10"x10 Chair 10: 10x Chair 10x10x step with towel under toe - 15x   CC toe stretch - rear foot position          15x   Ankle pumps             VG - DF ROM             Standing gastroc stretch  10"x5 NP See above See above GS/Soleus 30"x3 GS/Soleus 30"x3 GS/Soleus 30" 3x NP    education time  2' 3' 3' 5'        Ther Activity                                       Gait Training                                       Modalities

## 2023-07-19 ENCOUNTER — APPOINTMENT (OUTPATIENT)
Dept: PHYSICAL THERAPY | Facility: REHABILITATION | Age: 53
End: 2023-07-19
Payer: COMMERCIAL

## 2025-06-24 ENCOUNTER — TELEPHONE (OUTPATIENT)
Age: 55
End: 2025-06-24

## 2025-07-17 ENCOUNTER — ANESTHESIA (OUTPATIENT)
Dept: GASTROENTEROLOGY | Facility: HOSPITAL | Age: 55
End: 2025-07-17
Payer: COMMERCIAL

## 2025-07-17 ENCOUNTER — HOSPITAL ENCOUNTER (OUTPATIENT)
Dept: GASTROENTEROLOGY | Facility: HOSPITAL | Age: 55
Setting detail: OUTPATIENT SURGERY
End: 2025-07-17
Attending: INTERNAL MEDICINE
Payer: COMMERCIAL

## 2025-07-17 ENCOUNTER — ANESTHESIA EVENT (OUTPATIENT)
Dept: GASTROENTEROLOGY | Facility: HOSPITAL | Age: 55
End: 2025-07-17
Payer: COMMERCIAL

## 2025-07-17 VITALS
OXYGEN SATURATION: 99 % | RESPIRATION RATE: 12 BRPM | WEIGHT: 157.5 LBS | SYSTOLIC BLOOD PRESSURE: 148 MMHG | BODY MASS INDEX: 24.72 KG/M2 | DIASTOLIC BLOOD PRESSURE: 63 MMHG | HEIGHT: 67 IN | TEMPERATURE: 97.9 F | HEART RATE: 60 BPM

## 2025-07-17 DIAGNOSIS — R13.10 DYSPHAGIA, UNSPECIFIED: ICD-10-CM

## 2025-07-17 PROCEDURE — 88305 TISSUE EXAM BY PATHOLOGIST: CPT | Performed by: SPECIALIST

## 2025-07-17 RX ORDER — PROPOFOL 10 MG/ML
INJECTION, EMULSION INTRAVENOUS AS NEEDED
Status: DISCONTINUED | OUTPATIENT
Start: 2025-07-17 | End: 2025-07-17

## 2025-07-17 RX ORDER — SODIUM CHLORIDE, SODIUM LACTATE, POTASSIUM CHLORIDE, CALCIUM CHLORIDE 600; 310; 30; 20 MG/100ML; MG/100ML; MG/100ML; MG/100ML
INJECTION, SOLUTION INTRAVENOUS CONTINUOUS PRN
Status: DISCONTINUED | OUTPATIENT
Start: 2025-07-17 | End: 2025-07-17

## 2025-07-17 RX ORDER — LIDOCAINE HYDROCHLORIDE 20 MG/ML
INJECTION, SOLUTION EPIDURAL; INFILTRATION; INTRACAUDAL; PERINEURAL AS NEEDED
Status: DISCONTINUED | OUTPATIENT
Start: 2025-07-17 | End: 2025-07-17

## 2025-07-17 RX ADMIN — PROPOFOL 100 MG: 10 INJECTION, EMULSION INTRAVENOUS at 11:50

## 2025-07-17 RX ADMIN — PROPOFOL 50 MG: 10 INJECTION, EMULSION INTRAVENOUS at 11:55

## 2025-07-17 RX ADMIN — PROPOFOL 50 MG: 10 INJECTION, EMULSION INTRAVENOUS at 11:58

## 2025-07-17 RX ADMIN — SODIUM CHLORIDE, SODIUM LACTATE, POTASSIUM CHLORIDE, AND CALCIUM CHLORIDE: .6; .31; .03; .02 INJECTION, SOLUTION INTRAVENOUS at 11:45

## 2025-07-17 RX ADMIN — LIDOCAINE HYDROCHLORIDE 100 MG: 20 INJECTION, SOLUTION EPIDURAL; INFILTRATION; INTRACAUDAL; PERINEURAL at 11:50

## 2025-07-17 RX ADMIN — PROPOFOL 50 MG: 10 INJECTION, EMULSION INTRAVENOUS at 11:52

## 2025-07-17 NOTE — ANESTHESIA PREPROCEDURE EVALUATION
Procedure:  EGD    Relevant Problems   ANESTHESIA   (-) History of anesthesia complications      CARDIO   (+) Varicose veins   (+) Venous (peripheral) insufficiency   (-) Chest pain   (-) HAILE (dyspnea on exertion)      GI/HEPATIC   (+) Esophageal reflux      HEMATOLOGY   (+) Anemia      NEURO/PSYCH   (+) Depression      PULMONARY   (+) Extrinsic asthma   (-) Shortness of breath   (-) Sleep apnea   (-) URI (upper respiratory infection)        Physical Exam    Airway     Mallampati score: II  TM Distance: >3 FB  Neck ROM: full      Cardiovascular      Dental       Pulmonary      Neurological      Other Findings  post-pubertal.      Anesthesia Plan  ASA Score- 2     Anesthesia Type- IV sedation with anesthesia with ASA Monitors.         Additional Monitors:     Airway Plan: natural airway.           Plan Factors-    Chart reviewed. EKG reviewed.  Existing labs reviewed. Patient summary reviewed.                  Induction- intravenous.    Postoperative Plan- .   Monitoring Plan - Monitoring plan - standard ASA monitoring          Informed Consent- Anesthetic plan and risks discussed with patient.  I personally reviewed this patient with the CRNA. Discussed and agreed on the Anesthesia Plan with the CRNA..      NPO Status:  Vitals Value Taken Time   Date of last liquid 07/16/25 07/17/25 11:25   Time of last liquid 0400 07/17/25 11:25   Date of last solid 07/16/25 07/17/25 11:25   Time of last solid 2300 07/17/25 11:25

## 2025-07-17 NOTE — H&P
"History and Physical -  [facility]  Jessica Iniguez 54 y.o. female MRN: [id number]                  HPI: Jessica Iniguez is a 54 y.o. year old female who presents for EGD.  Indications for the procedure: Dysphagia, history of eosinophilic esophagitis.      REVIEW OF SYSTEMS: Per the HPI, and otherwise unremarkable.    Historical Information   Past Medical History[1]  Past Surgical History[2]  Social History   Social History     Substance and Sexual Activity   Alcohol Use No    Comment: SOCIAL     Social History     Substance and Sexual Activity   Drug Use Not on file     Tobacco Use History[3]  Family History[4]    Meds/Allergies     Current Medications[5]    Allergies[6]    Objective     /81   Pulse 66   Temp (!) 97 °F (36.1 °C) (Temporal)   Resp 21   Ht 5' 7\" (1.702 m)   Wt 71.4 kg (157 lb 8 oz)   SpO2 100%   BMI 24.67 kg/m²       PHYSICAL EXAM    Gen: NAD  Head: NCAT  CV: RRR  CHEST: Clear  ABD: soft, NT/ND  EXT: no edema      ASSESSMENT/PLAN:  This is a 54 y.o. year old female here for EGD with possible esophageal dilatation, and she is stable and optimized for her procedure.            [1]   Past Medical History:  Diagnosis Date    Allergic rhinitis     Vitiligo    [2]   Past Surgical History:  Procedure Laterality Date    BUNIONECTOMY      correction by cheilectomy   [3]   Social History  Tobacco Use   Smoking Status Never   Smokeless Tobacco Never   [4]   Family History  Problem Relation Name Age of Onset    Other Mother          porphyria   [5]   Current Outpatient Medications:     Cholecalciferol 25 MCG (1000 UT) tablet    albuterol (PROAIR HFA) 90 mcg/act inhaler    [id number]-Zn (FERIVA 21/7) 75-1 MG TABS    finasteride (PROSCAR) 5 mg tablet    spironolactone (ALDACTONE) 100 mg tablet  [6]   Allergies  Allergen Reactions    Amoxicillin Angioedema     Had hives with augmentin    Amoxicillin-Pot Clavulanate Hives    Bee Pollen Swelling    Bee Venom     Cat " Dander     Dog Epithelium     Dust Mite Extract     Pollen Extract

## 2025-07-17 NOTE — ANESTHESIA POSTPROCEDURE EVALUATION
Post-Op Assessment Note    CV Status:  Stable    Pain management: adequate       Mental Status:  Arousable and sleepy   Hydration Status:  Euvolemic   PONV Controlled:  Controlled   Airway Patency:  Patent     Post Op Vitals Reviewed: Yes    No anethesia notable event occurred.    Staff: CRNA           Last Filed PACU Vitals:  Vitals Value Taken Time   Temp 97.9    Pulse 60    /80    Resp 16    SpO2 95%

## 2025-07-22 PROCEDURE — 88305 TISSUE EXAM BY PATHOLOGIST: CPT | Performed by: SPECIALIST
